# Patient Record
Sex: FEMALE | Race: WHITE | NOT HISPANIC OR LATINO | Employment: FULL TIME | URBAN - METROPOLITAN AREA
[De-identification: names, ages, dates, MRNs, and addresses within clinical notes are randomized per-mention and may not be internally consistent; named-entity substitution may affect disease eponyms.]

---

## 2023-05-01 ENCOUNTER — OCCUPATIONAL MEDICINE (OUTPATIENT)
Dept: URGENT CARE | Facility: PHYSICIAN GROUP | Age: 28
End: 2023-05-01
Payer: COMMERCIAL

## 2023-05-01 VITALS
RESPIRATION RATE: 18 BRPM | HEART RATE: 66 BPM | OXYGEN SATURATION: 97 % | DIASTOLIC BLOOD PRESSURE: 56 MMHG | WEIGHT: 186 LBS | HEIGHT: 72 IN | BODY MASS INDEX: 25.19 KG/M2 | SYSTOLIC BLOOD PRESSURE: 100 MMHG | TEMPERATURE: 98.3 F

## 2023-05-01 DIAGNOSIS — Z02.1 PRE-EMPLOYMENT DRUG SCREENING: Primary | ICD-10-CM
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
## 2023-05-01 DIAGNOSIS — R07.81 RIB PAIN: ICD-10-CM

## 2023-05-01 LAB
AMP AMPHETAMINE: NORMAL
BAR BARBITURATES: NORMAL
BREATH ALCOHOL COMMENT: NORMAL
BZO BENZODIAZEPINES: NORMAL
COC COCAINE: NORMAL
INT CON NEG: NORMAL
INT CON POS: NORMAL
MDMA ECSTASY: NORMAL
MET METHAMPHETAMINES: NORMAL
MTD METHADONE: NORMAL
OPI OPIATES: NORMAL
OXY OXYCODONE: NORMAL
PCP PHENCYCLIDINE: NORMAL
POC BREATHALIZER: 0 PERCENT (ref 0–0.01)
POC URINE DRUG SCREEN OCDRS: NEGATIVE
THC: NORMAL

## 2023-05-01 PROCEDURE — 80305 DRUG TEST PRSMV DIR OPT OBS: CPT

## 2023-05-01 PROCEDURE — 99203 OFFICE O/P NEW LOW 30 MIN: CPT

## 2023-05-01 PROCEDURE — 82075 ASSAY OF BREATH ETHANOL: CPT

## 2023-05-01 RX ORDER — HYDROXYZINE HYDROCHLORIDE 10 MG/1
TABLET, FILM COATED ORAL
COMMUNITY
Start: 2023-03-19 | End: 2023-09-24

## 2023-05-01 RX ORDER — CYCLOBENZAPRINE HCL 10 MG
10 TABLET ORAL
Qty: 7 TABLET | Refills: 0 | Status: SHIPPED | OUTPATIENT
Start: 2023-05-01 | End: 2023-05-08

## 2023-05-01 ASSESSMENT — ENCOUNTER SYMPTOMS
SHORTNESS OF BREATH: 0
COUGH: 0
FALLS: 0

## 2023-05-01 NOTE — PROGRESS NOTES
Subjective:     Shruthi Alvarado is a 28 y.o. female who presents for Work-Related Injury (DOI 04/30/2023 rib pain,left side)      DOI: 4/30/23  JOSE: Patient was lifting a water heater at work. She was trying to place the water heater (weighing approximately 50lbs) into a box and she felt a slipping sensation in her L ribcage. She immediately set the water heater down. She reports that she has had her ribs become displaced before. She awoke this morning with increased pain. She has tried ice and Ibuprofen with minimal relief of pain.   Prior injury: Yes, in November of 2022, but the injury had fully resolved. Although not formally diagnosed. She has seen a chiropractor who told her that her rib was out of place.   2nd Job: None.       Review of Systems   Respiratory:  Negative for cough and shortness of breath.    Cardiovascular:  Negative for chest pain.   Musculoskeletal:  Negative for falls.     PMH:  has no past medical history on file.  MEDS:   Current Outpatient Medications:   •  hydrOXYzine HCl (ATARAX) 10 MG Tab, TAKE 1 TABLET BY MOUTH EVERY NIGHT AT BEDTIME FOR INSOMNIA, Disp: , Rfl:   •  cyclobenzaprine (FLEXERIL) 10 mg Tab, Take 1 Tablet by mouth 1 time a day as needed for Muscle Spasms or Moderate Pain (To be taken at night) for up to 7 days., Disp: 7 Tablet, Rfl: 0  ALLERGIES:   Allergies   Allergen Reactions   • Cefdinir    • Toradol      SURGHX: History reviewed. No pertinent surgical history.  SOCHX:    FH: Family history was reviewed, no pertinent findings to report     Objective:     /56 (BP Location: Right arm, Patient Position: Sitting, BP Cuff Size: Adult)   Pulse 66   Temp 36.8 °C (98.3 °F) (Temporal)   Resp 18   Ht 1.829 m (6')   Wt 84.4 kg (186 lb)   SpO2 97%   BMI 25.23 kg/m²     Constitutional: Patient is in no acute distress. Appears well-developed and well-nourished.   Cardiovascular: Normal rate.    Pulmonary/Chest: Effort normal. No respiratory distress.   Neurological:  Patient is alert and oriented to person, place, and time.   Skin: Skin is warm and dry.   Psychiatric: Normal mood and affect. Behavior is normal.     General: Patient appears well, alert and oriented.  MSK: Tenderness to L ribcage. No bruising or skin changes.   Resp: Lungs CTAB. No wheezes, rhonchi rales.  Cardio: RRR. No murmurs, rubs, gallops.       Assessment/Plan:       1. Rib pain  - cyclobenzaprine (FLEXERIL) 10 mg Tab; Take 1 Tablet by mouth 1 time a day as needed for Muscle Spasms or Moderate Pain (To be taken at night) for up to 7 days.  Dispense: 7 Tablet; Refill: 0    2. Work related injury    Other orders  - hydrOXYzine HCl (ATARAX) 10 MG Tab; TAKE 1 TABLET BY MOUTH EVERY NIGHT AT BEDTIME FOR INSOMNIA    Released to Restricted Duty FROM 5/1/2023 TO 5/5/2023  Patient is to perform primarily seated work with minimal twisting.   Flexeril to be taken at night. Do not take concurrently with Hydroxyzine.   Ibuprofen and Tylenol OTC for pain as needed.         Differential diagnosis, natural history, supportive care, and indications for immediate follow-up discussed.    Approximately 25 minutes was spent in preparing for visit, obtaining history, exam and evaluation, patient counseling/education and post visit documentation/orders.

## 2023-05-01 NOTE — LETTER
[facility]  [address] - [place] [address]  Phone:  [phone] - Fax:  [phone]   [facility] Progress Report and Disability Certification  Date of Service: 5/1/2023   No Show:  No  Date / Time of Next Visit: 5/5/2023@8:30AM   Claim Information   Patient Name: Shruthi Alvarado  Claim Number:     Employer:   [employer] Date of Injury: 4/30/2023     Insurer / TPA: [employer]  ID / SSN:     Occupation: Trainer  Diagnosis: Diagnoses of Rib pain and Work related injury were pertinent to this visit.    Medical Information   Related to Industrial Injury? Yes    Subjective Complaints:  DOI: 4/30/23  JOSE: Patient was lifting a water heater at work. She was trying to place the water heater (weighing approximately 50lbs) into a box and she felt a slipping sensation in her L ribcage. She immediately set the water heater down. She reports that she has had her ribs become displaced before. She awoke this morning with increased pain. She has tried ice and Ibuprofen with minimal relief of pain.   Prior injury: Yes, in November of 2022, but the injury had fully resolved. Although not formally diagnosed. She has seen a chiropractor who told her that her rib was out of place.   2nd Job: None.      Objective Findings: General: Patient appears well, alert and oriented.  MSK: Tenderness to L ribcage. No bruising or skin changes.   Resp: Lungs CTAB. No wheezes, rhonchi rales.  Cardio: RRR. No murmurs, rubs, gallops.      Pre-Existing Condition(s): She has injured this rib in the past although not formally diagnosed. She was told by a chiropractor in November of 2022 that her rib was out of place, but that injury had resolved.    Assessment:   Initial Visit    Status: Additional Care Required  Permanent Disability:No    Plan: Medication (NOT at Work)    Diagnostics:      Comments:       Disability Information   Status: Released to Restricted Duty     From:  2023  Through: 2023 Restrictions are: Temporary   Physical Restrictions   Sitting:    Standing:    Stoopin hrs/day Bendin hrs/day   Squattin hrs/day Walking:    Climbin hrs/day Pushin hrs/day   Pullin hrs/day Other:    Reaching Above Shoulder (L): 0 hrs/day Reaching Above Shoulder (R):       Reaching Below Shoulder (L):    Reaching Below Shoulder (R):      Not to exceed Weight Limits   Carrying(hrs): 1 Weight Limit(lb): < or = to 10 pounds Lifting(hrs): 1 Weight  Limit(lb): < or = to 10 pounds   Comments: Patient is to perform primarily seated work with minimal twisting.   Flexeril to be taken at night. Do not take concurrently with Hydroxyzine.   Ibuprofen and Tylenol OTC for pain as needed.    Repetitive Actions   Hands: i.e. Fine Manipulations from Grasping:     Feet: i.e. Operating Foot Controls:     Driving / Operate Machinery:     Health Care Provider’s Original or Electronic Signature  Loren Nunez P.A.-C. Health Care Provider’s Original or Electronic Signature    Nadeem Naqvi DO MPH     Clinic Name / Location: [address]  [place] [address] Clinic Phone Number: Dept: [phone]   Appointment Time: 1:50 Pm Visit Start Time: 2:40 PM   Check-In Time:  2:27 Pm Visit Discharge Time:  3:35PM   Original-Treating Physician or Chiropractor    Page 2-Insurer/TPA    Page 3-Employer    Page 4-Employee

## 2023-05-01 NOTE — LETTER
EMPLOYEE’S CLAIM FOR COMPENSATION/ REPORT OF INITIAL TREATMENT  FORM C-4  PLEASE TYPE OR PRINT    EMPLOYEE’S CLAIM - PROVIDE ALL INFORMATION REQUESTED   First Name  Shruthi Last Name  Christiano Birthdate                    1995                Sex  female Claim Number (Insurer’s Use Only)   Home Address  [address] Age  28 y.o. 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 (if applicable)  Lifting a water heater    How did this injury or occupational disease occur? (Be specific and answer in detail. Use additional sheet if necessary)  I was trying to lift a water heater to pack it into a box and felt a rib slip.   If you believe that you have an occupational disease, when did you first have knowledge of the disability and its relationship to your employment?  N/A Witnesses to the Accident (if applicable)  no one      Nature of Injury or Occupational Disease  Strain  Part(s) of Body Injured or Affected  Chest, N/A, N/A    I CERTIFY THAT THE ABOVE IS TRUE AND CORRECT TO T HE BEST OF MY KNOWLEDGE AND THAT I HAVE  PROVIDED THIS INFORMATION IN ORDER TO OBTAIN THE BENEFITS OF [place], OR [place]).  I HEREBY AUTHORIZE ANY PHYSICIAN, CHIROPRACTOR, SURGEON, PRACTITIONER OR ANY OTHER PERSON, ANY HOSPITAL, INCLUDING [facility] OR [facility], ANY  MEDICAL SERVICE ORGANIZATION, ANY INSURANCE COMPANY, OR OTHER INSTITUTION OR ORGANIZATION TO RELEASE TO EACH OTHER, ANY MEDICAL OR OTHER INFORMATION, INCLUDING BENEFITS PAID OR PAYABLE, PERTINENT TO THIS INJURY OR DISEASE, EXCEPT INFORMATION RELATIVE TO DIAGNOSIS, TREATMENT AND/OR COUNSELING FOR AIDS, PSYCHOLOGICAL CONDITIONS, ALCOHOL OR CONTROLLED SUBSTANCES, FOR WHICH I MUST GIVE SPECIFIC AUTHORIZATION.  A PHOTOSTAT OF THIS AUTHORIZATION SHALL BE VALID AS THE ORIGINAL.       Date 5/1/2023   [facility] Employee’s Original or  *Electronic Signature   THIS REPORT MUST BE COMPLETED AND MAILED WITHIN 3 WORKING DAYS OF TREATMENT   [facility]  Name of Facility  [facility]   Date  5/1/2023 Diagnosis and Description of Injury or Occupational Disease  (R07.81) Rib pain  (Y99.0) Work related injury Is there evidence that the injured employee was under the influence of alcohol and/or another controlled substance at the time of accident?  ? No ? Yes (if yes, please explain)   Hour  2:40 PM   Diagnoses of Rib pain and Work related injury were pertinent to this visit. No   Treatment  -Rest, ice  -Tylenol, Ibuprofen OTC as needed for pain  -Flexeril to be taken at night. Not to be taken concurrently with Hydroxyzine.     Have you advised the patient to remain off work five days or     more?    X-Ray Findings      ? Yes Indicate dates:   From 5/1/2023 To  5/5/2023   From information given by the employee, together with medical evidence, can        you directly connect this injury or occupational disease as job incurred?  Yes ? No If no, is the  "injured employee capable of:  ? full duty  No ? modified duty  Yes   Is additional medical care by a physician indicated?  Yes If modified duty, specify any limitations / restrictions  See D-39   Do you know of any previous injury or disease contributing to this condition or occupational disease?  ? Yes ? No (Explain if yes)                          No   Date  5/1/2023 Print Health Care Provider's  Name  Loren Paredes P.A.-C. I certify that the employer’s copy of  this form was delivered to the employer on:   Address  [address] [place] [address]. [address] Insurer’s Use Only     [facility] Zip  [address]    Provider’s Tax ID Number  [id number] Telephone  Dept: [phone]             Health Care Provider’s Original or Electronic Signature  e-SignLOREN PAREDES P.A.-C. Degree (MD,DO, DC,PAAudreyC,APRN)  PAAudreyC      * Complete and attach Release of Information (Form C-4A) when injured employee signs C-4 Form electronically  ORIGINAL - TREATING HEALTHCARE PROVIDER PAGE 2 - INSURER/TPA PAGE 3 - EMPLOYER PAGE 4 - EMPLOYEE             Form C-4 (rev.08/21)           BRIEF DESCRIPTION OF RIGHTS AND BENEFITS  (Pursuant to [place] 616C.050)    Notice of Injury or Occupational Disease (Incident Report Form C-1): If an injury or occupational disease (OD) arises out of and in the course of employment, you must provide written notice to your employer as soon as practicable, but no later than 7 days after the accident or OD. Your employer shall maintain a sufficient supply of the required forms.    Claim for Compensation (Form C-4): If medical treatment is sought, the form C-4 is available at the place of initial treatment. A completed \"Claim for Compensation\" (Form C-4) must be filed within 90 days after an accident or OD. The treating physician or chiropractor must, within 3 working days after treatment, complete and mail to the employer, the employer's insurer and third-party , the Claim for " Compensation.    Medical Treatment: If you require medical treatment for your on-the-job injury or OD, you may be required to select a physician or chiropractor from a list provided by your workers’ compensation insurer, if it has contracted with an Organization for Managed Care (MCO) or Preferred Provider Organization (PPO) or providers of health care. If your employer has not entered into a contract with an MCO or PPO, you may select a physician or chiropractor from the Panel of Physicians and Chiropractors. Any medical costs related to your industrial injury or OD will be paid by your insurer.    Temporary Total Disability (TTD): If your doctor has certified that you are unable to work for a period of at least 5 consecutive days, or 5 cumulative days in a 20-day period, or places restrictions on you that your employer does not accommodate, you may be entitled to TTD compensation.    Temporary Partial Disability (TPD): If the wage you receive upon reemployment is less than the compensation for TTD to which you are entitled, the insurer may be required to pay you TPD compensation to make up the difference. TPD can only be paid for a maximum of 24 months.    Permanent Partial Disability (PPD): When your medical condition is stable and there is an indication of a PPD as a result of your injury or OD, within 30 days, your insurer must arrange for an evaluation by a rating physician or chiropractor to determine the degree of your PPD. The amount of your PPD award depends on the date of injury, the results of the PPD evaluation, your age and wage.    Permanent Total Disability (PTD): If you are medically certified by a treating physician or chiropractor as permanently and totally disabled and have been granted a PTD status by your insurer, you are entitled to receive monthly benefits not to exceed 66 2/3% of your average monthly wage. The amount of your PTD payments is subject to reduction if you previously received a  [facility]-sum PPD award.    Vocational Rehabilitation Services: You may be eligible for vocational rehabilitation services if you are unable to return to the job due to a permanent physical impairment or permanent restrictions as a result of your injury or occupational disease.    Transportation and Per Diane Reimbursement: You may be eligible for travel expenses and per diane associated with medical treatment.    Reopening: You may be able to reopen your claim if your condition worsens after claim closure.     Appeal Process: If you disagree with a written determination issued by the insurer or the insurer does not respond to your request, you may appeal to the Department of Administration, , by following the instructions contained in your determination letter. You must appeal the determination within 70 days from the date of the determination letter at [address], [address], [place], [place] [address], or [address], [address], [place], [place] [address]. If you disagree with the  decision, you may appeal to the Department of Administration, . You must file your appeal within 30 days from the date of the  decision letter at [address], [address], [place], [place] [address], or [address], [address], [place], [place] [address]. If you disagree with a decision of an , you may file a petition for judicial review with the District Court. You must do so within 30 days of the Appeal Officer’s decision. You may be represented by an  at your own expense or you may contact the [facility] for possible representation.    [place]  for Injured Workers (NAIW): If you disagree with a  decision, you may request that NAIW represent you without charge at an  Hearing. For information regarding denial of benefits, you may contact the [facility] at: [address], [address], [place]  [address], [phone], or [address], [address], [place] [address], [phone]    To File a Complaint with the Division: If you wish to file a complaint with the  of the Division of Industrial Relations (DIR),  please contact the Workers’ Compensation Section, [address], [address], [place], [place] [address], telephone [phone], or [address], [address], [place], [place] [address], telephone [phone].    For assistance with Workers’ Compensation Issues: You may contact the [facility] Office for Consumer Health Assistance, [address], [address], [place], [place] [address], Toll Free 1-727.426.7703, Web site: [email or web address] E-mail: [email or web address]              __________________________________________________________________                                    _________________            Employee Name / Signature                                                                                                                            Date                                                                                                                                                                                                                              D-2 (rev. 10/20)

## 2023-05-05 ENCOUNTER — OCCUPATIONAL MEDICINE (OUTPATIENT)
Dept: URGENT CARE | Facility: PHYSICIAN GROUP | Age: 28
End: 2023-05-05
Payer: COMMERCIAL

## 2023-05-05 VITALS
HEART RATE: 92 BPM | TEMPERATURE: 98.6 F | SYSTOLIC BLOOD PRESSURE: 108 MMHG | DIASTOLIC BLOOD PRESSURE: 58 MMHG | OXYGEN SATURATION: 99 % | WEIGHT: 185 LBS | BODY MASS INDEX: 25.06 KG/M2 | RESPIRATION RATE: 16 BRPM | HEIGHT: 72 IN
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
## 2023-05-05 DIAGNOSIS — R07.81 RIB PAIN: ICD-10-CM

## 2023-05-05 PROCEDURE — 99213 OFFICE O/P EST LOW 20 MIN: CPT | Performed by: NURSE PRACTITIONER

## 2023-05-05 RX ORDER — NAPROXEN 500 MG/1
TABLET ORAL
COMMUNITY
Start: 2023-02-17 | End: 2023-09-24

## 2023-05-05 ASSESSMENT — ENCOUNTER SYMPTOMS
TINGLING: 0
RESPIRATORY NEGATIVE: 1
FALLS: 0
FEVER: 0
CARDIOVASCULAR NEGATIVE: 1
MYALGIAS: 1
BRUISES/BLEEDS EASILY: 0
SENSORY CHANGE: 0
CHILLS: 0
GASTROINTESTINAL NEGATIVE: 1
WEAKNESS: 0

## 2023-05-05 NOTE — LETTER
[facility]  [address] [place] [address]. [address] - [place] [address]  Phone:  [phone] - Fax:  [phone]   [facility] Progress Report and Disability Certification  Date of Service: 5/5/2023   No Show:  No  Date / Time of Next Visit: 5/12/2023   Claim Information   Patient Name: Shruthi Alvarado  Claim Number:     Employer:    Date of Injury: 4/30/2023     Insurer / TPA: [employer]  ID / SSN:     Occupation: Trainer  Diagnosis: Diagnoses of Rib pain and Work related injury were pertinent to this visit.    Medical Information   Related to Industrial Injury? Yes    Subjective Complaints:  DOI: 4/30/23, Initial visit note:  JOSE: Patient was lifting a water heater at work. She was trying to place the water heater (weighing approximately 50lbs) into a box and she felt a slipping sensation in her L ribcage. She immediately set the water heater down. She reports that she has had her ribs become displaced before. She awoke this morning with increased pain. She has tried ice and Ibuprofen with minimal relief of pain.   Prior injury: Yes, in November of 2022, but the injury had fully resolved. Although not formally diagnosed. She has seen a chiropractor who told her that her rib was out of place.   2nd Job: None.     Today, 5/5/2023. 2nd encounter.  States still experiencing posterior left lower rib pain in her thoracic region.  Pain increases with movement especially with bending over.  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  Denies issues with breathing.  Heat application only during showers.  Denies bruising but did have mild swelling initially after injury.  States site is tender to touch.  Using no over-the-counter topical pain reliever.  Taking ibuprofen 800 twice daily but states not helping.  Flexeril taken only at nighttime due to drowsy effect.   Objective Findings: A/O x 3. Skin p/w/d, vitals WNL.  Decreased range of motion with flexion and twisting  motions due to discomfort.  Able to raise arms above head with minimal discomfort.  Tenderness on palpation of posterior left-sided thoracic region.  No swelling, bruising, erythema or crepitus on palpation.  No difficulty breathing while talking or at rest.   Pre-Existing Condition(s):     Assessment:   Condition Same    Status: Additional Care Required  Permanent Disability:No    Plan:      Diagnostics:      Comments:       Disability Information   Status: Released to Restricted Duty    From:  2023  Through: 2023 Restrictions are:     Physical Restrictions   Sitting:    Standing:    Stoopin hrs/day Bendin hrs/day   Squattin hrs/day Walking:    Climbin hrs/day Pushing:      Pulling:    Other:    Reaching Above Shoulder (L): 0 hrs/day Reaching Above Shoulder (R): 0 hrs/day     Reaching Below Shoulder (L):  0 hrs/day Reaching Below Shoulder (R):  0 hrs/day   Not to exceed Weight Limits   Carrying(hrs): 1 Weight Limit(lb): < or = to 10 pounds Lifting(hrs): 1 Weight  Limit(lb): < or = to 10 pounds   Comments: -Patient to continue to do primarily seated work with minimal twisting  -Continue to take Flexeril at night only due to drowsy effect as needed for muscle spasms  -Recommend to increase ibuprofen use to 600 mg every 6 hours as needed for pain, may alternate with Tylenol as needed  -Recommend heat complication with heat pad periodically throughout the workday when seated, 2-3x/day  -May use topical pain reliever that is lidocaine-based to help with localized pain relief-do not use under heat pad  -Recheck in 1 week    Repetitive Actions   Hands: i.e. Fine Manipulations from Grasping:     Feet: i.e. Operating Foot Controls:     Driving / Operate Machinery:     Health Care Provider’s Original or Electronic Signature  RAMIREZ Mooney. Health Care Provider’s Original or Electronic Signature    Nadeem Naqvi DO MPH     Clinic Name / Location: [place] [address]  [address]  [place] [address] Clinic Phone Number: Dept: [phone]   Appointment Time: 8:30 Am Visit Start Time: 8:27 AM   Check-In Time:  8:21 Am Visit Discharge Time:     Original-Treating Physician or Chiropractor    Page 2-Insurer/TPA    Page 3-Employer    Page 4-Employee

## 2023-05-05 NOTE — PROGRESS NOTES
Subjective     Shruthi Alvarado is a 28 y.o. female who presents with Rib Injury (Popped out of place (x6 days) follow up, not feeling better (Pain level 8), hurts to breath still muscle spasms are increasing, seen here at [facility] for first appointment )      DOI: 4/30/23, Initial visit note:  JOSE: Patient was lifting a water heater at work. She was trying to place the water heater (weighing approximately 50lbs) into a box and she felt a slipping sensation in her L ribcage. She immediately set the water heater down. She reports that she has had her ribs become displaced before. She awoke this morning with increased pain. She has tried ice and Ibuprofen with minimal relief of pain.   Prior injury: Yes, in November of 2022, but the injury had fully resolved. Although not formally diagnosed. She has seen a chiropractor who told her that her rib was out of place.   2nd Job: None.     Today, 5/5/2023. 2nd encounter.  States still experiencing posterior left lower rib pain in her thoracic region.  Pain increases with movement especially with bending over.  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  Denies issues with breathing.  Heat application only during showers.  Denies bruising but did have mild swelling initially after injury.  States site is tender to touch.  Using no over-the-counter topical pain reliever.  Taking ibuprofen 800 twice daily but states not helping.  Flexeril taken only at nighttime due to drowsy effect.     Rib Injury  Associated symptoms include myalgias. Pertinent negatives include no chills, fever, rash or weakness.   PMH: No pertinent past medical history to this problem  MEDS: Medications were reviewed in Epic  ALLERGIES: Allergies were reviewed in Epic  FH: No pertinent family history to this problem       Review of Systems   Constitutional:  Negative for chills, fever and malaise/fatigue.   Respiratory: Negative.     Cardiovascular: Negative.    Gastrointestinal: Negative.     Musculoskeletal:  Positive for myalgias. Negative for falls and joint pain.   Skin:  Negative for itching and rash.   Neurological:  Negative for tingling, sensory change and weakness.   Endo/Heme/Allergies:  Does not bruise/bleed easily.   All other systems reviewed and are negative.           Objective     /58 (BP Location: Left arm, Patient Position: Sitting, BP Cuff Size: Adult)   Pulse 92   Temp 37 °C (98.6 °F) (Temporal)   Resp 16   Ht 1.829 m (6')   Wt 83.9 kg (185 lb)   SpO2 99%   BMI 25.09 kg/m²      Physical Exam  Vitals reviewed.   Constitutional:       General: She is awake. She is not in acute distress.     Appearance: Normal appearance. She is well-developed. She is not ill-appearing, toxic-appearing or diaphoretic.   Cardiovascular:      Rate and Rhythm: Normal rate.   Pulmonary:      Effort: Pulmonary effort is normal.   Musculoskeletal:      Thoracic back: Spasms and tenderness present. No swelling, edema, deformity, signs of trauma, lacerations or bony tenderness. Decreased range of motion. No scoliosis.        Back:    Skin:     General: Skin is warm and dry.      Findings: No abrasion, ecchymosis, erythema, signs of injury, laceration, rash or wound.   Neurological:      Mental Status: She is alert and oriented to person, place, and time.   Psychiatric:         Attention and Perception: Attention normal.         Mood and Affect: Mood normal.         Speech: Speech normal.         Behavior: Behavior normal. Behavior is cooperative.       A/O x 3. Skin p/w/d, vitals WNL.  Decreased range of motion with flexion and twisting motions due to discomfort.  Able to raise arms above head with minimal discomfort.  Tenderness on palpation of posterior left-sided thoracic region.  No swelling, bruising, erythema or crepitus on palpation.  No difficulty breathing while talking or at rest.                   Assessment & Plan        1. Rib pain      2. Work related injury       -Patient to  continue to do primarily seated work with minimal twisting   -Continue to take Flexeril at night only due to drowsy effect as needed for muscle spasms   -Recommend to increase ibuprofen use to 600 mg every 6 hours as needed for pain, may alternate with Tylenol as needed   -Recommend heat complication with heat pad periodically throughout the workday when seated, 2-3x/day   -May use topical pain reliever that is lidocaine-based to help with localized pain relief-do not use under heat pad   -Recheck in 1 week

## 2023-05-12 ENCOUNTER — OCCUPATIONAL MEDICINE (OUTPATIENT)
Dept: URGENT CARE | Facility: PHYSICIAN GROUP | Age: 28
End: 2023-05-12
Payer: COMMERCIAL

## 2023-05-12 VITALS
HEART RATE: 89 BPM | HEIGHT: 72 IN | BODY MASS INDEX: 25.06 KG/M2 | SYSTOLIC BLOOD PRESSURE: 104 MMHG | TEMPERATURE: 98.9 F | DIASTOLIC BLOOD PRESSURE: 60 MMHG | OXYGEN SATURATION: 94 % | RESPIRATION RATE: 12 BRPM | WEIGHT: 185 LBS

## 2023-05-12 DIAGNOSIS — S29.011D INTERCOSTAL MUSCLE STRAIN, SUBSEQUENT ENCOUNTER: ICD-10-CM

## 2023-05-12 PROCEDURE — 99213 OFFICE O/P EST LOW 20 MIN: CPT | Performed by: FAMILY MEDICINE

## 2023-05-12 NOTE — PROGRESS NOTES
Subjective:         Chief Complaint   Patient presents with    Follow-Up     WC f/u pinched muscle pain still          DOI: 4/30      Here for f/u on left intercostal strain      States pain improved, but still present.       No longer needing to use flexeril - only taking motrin, as needed.                denies dyspnea.       Pertinent negatives include no claudication, cough, exertional chest pressure, fever, nausea, near-syncope, numbness, syncope or vomiting.         No past medical history on file.             Family history was reviewed and not pertinent       Review of Systems:  Review of Systems   Constitutional: Negative for fever.   HENT: no neck pain, headache or dizziness  Eyes: denies vision changes  Respiratory: no cough, congestion, SOB  Cardiovascular: denies palpations     Gastrointestinal: denies diarrhea, abdominal pain or constipation.  No blood in stool.  Musculoskeletal: denies back pain or joint pain    Skin: no itching or rash  Neurological: No numbness or tingling.   10 point ROS otherwise negative, except per HPI         Objective:     /60 (BP Location: Right arm, Patient Position: Sitting, BP Cuff Size: Adult)   Pulse 89   Temp 37.2 °C (98.9 °F) (Temporal)   Resp 12   Ht 1.829 m (6')   Wt 83.9 kg (185 lb)   SpO2 94%       Physical Exam   Constitutional: pt is oriented to person, place, and time. Pt appears well-developed and well-nourished.   HENT:   Head: Normocephalic and atraumatic.   Mouth/Throat: Oropharynx is clear and moist.   Eyes: Conjunctivae and EOM are normal. Pupils are equal, round, and reactive to light. No scleral icterus.   Neck: Normal range of motion. Neck supple. No JVD present. No thyromegaly present.   Cardiovascular: Normal rate, regular rhythm, normal heart sounds and intact distal pulses.  Exam reveals no gallop and no friction rub.    No murmur heard.  Pulmonary/Chest: Effort normal and breath sounds normal. No respiratory distress. Pt has no wheezes.  Pt has no rales. Pt exhibits point tenderness over several lower ribs on left side   Abdominal: Soft.   Musculoskeletal: Normal range of motion. Pt exhibits no edema.   Lymphadenopathy:     Pt has no cervical adenopathy.   Neurological: pt is alert and oriented to person, place, and time. No cranial nerve deficit.   Skin: Skin is warm and dry. No erythema.   Psychiatric: pt has a normal mood and affect. patient's behavior is normal.          Assessment/Plan:        1. Intercostal muscle strain, subsequent encounter  Improving  Restrictions per D39    F/u one wk

## 2023-05-12 NOTE — LETTER
[facility]  [address] [place] [address]. [address] - [place] [address]  Phone:  [phone] - Fax:  [phone]   [facility] Progress Report and Disability Certification  Date of Service: 5/12/2023   No Show:  No  Date / Time of Next Visit: 5/19/2023   Claim Information   Patient Name: Shruthi Alvarado  Claim Number:     Employer:   [employer] Date of Injury: 4/30/2023     Insurer / TPA: [employer]  ID / SSN:     Occupation: Trainer  Diagnosis: The encounter diagnosis was Intercostal muscle strain, subsequent encounter.    Medical Information   Related to Industrial Injury? Yes    Subjective Complaints:        DOI: 4/30      Here for f/u on left intercostal strain      States pain improved, but still present.       No longer needing to use flexeril - only taking motrin, as needed.                denies dyspnea.       Pertinent negatives include no claudication, cough, exertional chest pressure, fever, nausea, near-syncope, numbness, syncope or vomiting.        Objective Findings: Pulmonary/Chest: Effort normal and breath sounds normal. No respiratory distress. Pt has no wheezes. Pt has no rales. Pt exhibits point tenderness over several lower ribs on left side    Pre-Existing Condition(s):     Assessment:   Condition Improved    Status: Additional Care Required  Permanent Disability:No    Plan:      Diagnostics:      Comments:       Disability Information   Status: Released to Restricted Duty    From:  5/12/2023  Through: 5/19/2023 Restrictions are: Temporary   Physical Restrictions   Sitting:    Standing:    Stooping:    Bending:      Squatting:    Walking:    Climbing:    Pushing:      Pulling:    Other:    Reaching Above Shoulder (L):   Reaching Above Shoulder (R):       Reaching Below Shoulder (L):    Reaching Below Shoulder (R):      Not to exceed Weight Limits   Carrying(hrs):   Weight Limit(lb): < or = to 10 pounds Lifting(hrs):   Weight  Limit(lb):  < or = to 10 pounds   Comments: Intercostal muscle strain, subsequent encounter  Improving  Restrictions per D39    F/u one wk    Repetitive Actions   Hands: i.e. Fine Manipulations from Grasping:     Feet: i.e. Operating Foot Controls:     Driving / Operate Machinery:     Health Care Provider’s Original or Electronic Signature  Santos Olmstead M.D. Health Care Provider’s Original or Electronic Signature    Nadeem Naqvi DO MPH     Clinic Name / Location: [address]  [place] [address] Clinic Phone Number: Dept: [phone]   Appointment Time: 8:30 Am Visit Start Time: 8:33 AM   Check-In Time:  8:27 Am Visit Discharge Time: 9:10 AM   Original-Treating Physician or Chiropractor    Page 2-Insurer/TPA    Page 3-Employer    Page 4-Employee

## 2023-05-19 ENCOUNTER — OCCUPATIONAL MEDICINE (OUTPATIENT)
Dept: URGENT CARE | Facility: PHYSICIAN GROUP | Age: 28
End: 2023-05-19
Payer: COMMERCIAL

## 2023-05-19 VITALS
HEIGHT: 72 IN | WEIGHT: 185.6 LBS | SYSTOLIC BLOOD PRESSURE: 122 MMHG | HEART RATE: 88 BPM | TEMPERATURE: 98.5 F | RESPIRATION RATE: 16 BRPM | DIASTOLIC BLOOD PRESSURE: 80 MMHG | OXYGEN SATURATION: 97 % | BODY MASS INDEX: 25.14 KG/M2

## 2023-05-19 DIAGNOSIS — R07.81 RIB PAIN: ICD-10-CM

## 2023-05-19 DIAGNOSIS — S29.011D INTERCOSTAL MUSCLE STRAIN, SUBSEQUENT ENCOUNTER: ICD-10-CM

## 2023-05-19 PROCEDURE — 3079F DIAST BP 80-89 MM HG: CPT | Performed by: PHYSICIAN ASSISTANT

## 2023-05-19 PROCEDURE — 99213 OFFICE O/P EST LOW 20 MIN: CPT | Performed by: PHYSICIAN ASSISTANT

## 2023-05-19 PROCEDURE — 3074F SYST BP LT 130 MM HG: CPT | Performed by: PHYSICIAN ASSISTANT

## 2023-05-19 ASSESSMENT — ENCOUNTER SYMPTOMS
HEMOPTYSIS: 0
BRUISES/BLEEDS EASILY: 0
VOMITING: 0
BACK PAIN: 1
COUGH: 0
CHILLS: 0
SHORTNESS OF BREATH: 0
NAUSEA: 0
FEVER: 0

## 2023-05-19 NOTE — PROGRESS NOTES
"Subjective     Shruthi Alvarado is a 28 y.o. female who presents with Work-Related Injury (Follow Up. DOI 4/30/2023 Was lifting a water heater injuring Rib, Feels A lot Better, Soreness )      DOI: 4/30/2023      ORIGINAL HPI COPIED: \"Patient was lifting a water heater at work. She was trying to place the water heater (weighing approximately 50lbs) into a box and she felt a slipping sensation in her L ribcage. She immediately set the water heater down. She reports that she has had her ribs become displaced before. She awoke this morning with increased pain. She has tried ice and Ibuprofen with minimal relief of pain.   Prior injury: Yes, in November of 2022, but the injury had fully resolved. Although not formally diagnosed. She has seen a chiropractor who told her that her rib was out of place.   2nd Job: None.\"      05/19/2023, Visit # 4: At initial visit on 5/1/2023 patient was diagnosed with rib pain and was prescribed Flexeril.  It was also recommended that she use OTC ibuprofen or acetaminophen as needed.  She was also placed on muscle relaxers.  At return visit on 5/5 patient was reporting continued pain.  It was recommended that she use ibuprofen 600 mg every 6 hours as needed and alternate with Tylenol as well.  It was also recommended that she apply heating pads 2-3 times per day and continue to use the muscle relaxer at nighttime.  Also discussed option of trying topical lidocaine patches to the area.  She was continued on work restrictions.  At last follow-up on 5/12 patient was reporting significant improvement in her pain but still had some pain.  She was no longer needing to use the Flexeril, however.  She was placed on work restrictions of no lifting or carrying heavier than 10 pounds.  She now returns today for her fourth visit stating that she feels significant improvement in her symptoms.  Still does some mild soreness but no spasms and it does not really worsen with any type of movement.  She " would like to try full duty.      Review of Systems   Constitutional:  Negative for chills and fever.   Respiratory:  Negative for cough, hemoptysis and shortness of breath.    Cardiovascular:  Negative for chest pain.   Gastrointestinal:  Negative for nausea and vomiting.   Musculoskeletal:  Positive for back pain.        Rib pain   Endo/Heme/Allergies:  Does not bruise/bleed easily.         PMH: No pertinent past medical history to this problem  MEDS: Medications were reviewed in Epic  ALLERGIES: Allergies were reviewed in Epic  SOCHX: Reviewed  FH: No pertinent family history to this problem    Objective     /80 (BP Location: Right arm, Patient Position: Sitting, BP Cuff Size: Adult)   Pulse 88   Temp 36.9 °C (98.5 °F) (Temporal)   Resp 16   Ht 1.829 m (6')   Wt 84.2 kg (185 lb 9.6 oz)   SpO2 97%   BMI 25.17 kg/m²      Physical Exam  Constitutional:       General: She is not in acute distress.     Appearance: She is not diaphoretic.   HENT:      Head: Normocephalic and atraumatic.      Right Ear: External ear normal.      Left Ear: External ear normal.   Eyes:      Conjunctiva/sclera: Conjunctivae normal.      Pupils: Pupils are equal, round, and reactive to light.   Pulmonary:      Effort: Pulmonary effort is normal. No respiratory distress.      Breath sounds: No decreased breath sounds, wheezing, rhonchi or rales.   Chest:      Comments: Left-sided lateral and posterior rib cage/thoracic back region exhibits some very mild tenderness to palpation.  No spasm noted.  No overlying erythema or ecchymosis.  Patient has full range of motion.  Musculoskeletal:      Cervical back: Normal range of motion.   Skin:     Findings: No rash.   Neurological:      Mental Status: She is alert and oriented to person, place, and time.   Psychiatric:         Mood and Affect: Mood and affect normal.         Cognition and Memory: Memory normal.         Judgment: Judgment normal.           Assessment & Plan       1.  Intercostal muscle strain, subsequent encounter    2. Rib pain    Patient with significant improvement in her symptoms.  We will trial full duty for this week.  Return next Friday for reevaluation.  Anticipate discharge/MMI at that time.          Differential Diagnosis, natural history, and supportive care discussed. Return to the Urgent Care or follow up with your PCP if symptoms fail to resolve, or for any new or worsening symptoms. Emergency room precautions discussed. Patient and/or family appears understanding of information.

## 2023-05-19 NOTE — LETTER
"   [facility]  [address] [place] [address]. [address] - [place] [address]  Phone:  [phone] - Fax:  [phone]   [facility] Progress Report and Disability Certification  Date of Service: 5/19/2023   No Show:  No  Date / Time of Next Visit: 5/26/2023  8:30am    Claim Information   Patient Name: Shruthi Alvarado  Claim Number:     Employer:   [employer] Date of Injury: 4/30/2023     Insurer / TPA: [employer]  ID / SSN:     Occupation: Trainer  Diagnosis: Diagnoses of Intercostal muscle strain, subsequent encounter and Rib pain were pertinent to this visit.    Medical Information   Related to Industrial Injury? Yes    Subjective Complaints:  DOI: 4/30/2023      ORIGINAL HPI COPIED: \"Patient was lifting a water heater at work. She was trying to place the water heater (weighing approximately 50lbs) into a box and she felt a slipping sensation in her L ribcage. She immediately set the water heater down. She reports that she has had her ribs become displaced before. She awoke this morning with increased pain. She has tried ice and Ibuprofen with minimal relief of pain.   Prior injury: Yes, in November of 2022, but the injury had fully resolved. Although not formally diagnosed. She has seen a chiropractor who told her that her rib was out of place.   2nd Job: None.\"      05/19/2023, Visit # 4: At initial visit on 5/1/2023 patient was diagnosed with rib pain and was prescribed Flexeril.  It was also recommended that she use OTC ibuprofen or acetaminophen as needed.  She was also placed on muscle relaxers.  At return visit on 5/5 patient was reporting continued pain.  It was recommended that she use ibuprofen 600 mg every 6 hours as needed and alternate with Tylenol as well.  It was also recommended that she apply heating pads 2-3 times per day and continue to use the muscle relaxer at nighttime.  Also discussed option of trying topical lidocaine patches to the " area.  She was continued on work restrictions.  At last follow-up on 5/12 patient was reporting significant improvement in her pain but still had some pain.  She was no longer needing to use the Flexeril, however.  She was placed on work restrictions of no lifting or carrying heavier than 10 pounds.  She now returns today for her fourth visit stating that she feels significant improvement in her symptoms.  Still does some mild soreness but no spasms and it does not really worsen with any type of movement.  She would like to try full duty.     Objective Findings: Pulmonary:      Effort: Pulmonary effort is normal. No respiratory distress.      Breath sounds: No decreased breath sounds, wheezing, rhonchi or rales.   Chest:      Comments: Left-sided lateral and posterior rib cage/thoracic back region exhibits some very mild tenderness to palpation.  No spasm noted.  No overlying erythema or ecchymosis.  Patient has full range of motion.  Musculoskeletal:      Cervical back: Normal range of motion.      Pre-Existing Condition(s):     Assessment:   Condition Improved    Status: Additional Care Required  Permanent Disability:No    Plan:      Diagnostics:      Comments:       Disability Information   Status: Released to Full Duty    From:  5/19/2023  Through: 5/26/2023 Restrictions are: Temporary   Physical Restrictions   Sitting:    Standing:    Stooping:    Bending:      Squatting:    Walking:    Climbing:    Pushing:      Pulling:    Other:    Reaching Above Shoulder (L):   Reaching Above Shoulder (R):       Reaching Below Shoulder (L):    Reaching Below Shoulder (R):      Not to exceed Weight Limits   Carrying(hrs):   Weight Limit(lb):   Lifting(hrs):   Weight  Limit(lb):     Comments:      Repetitive Actions   Hands: i.e. Fine Manipulations from Grasping:     Feet: i.e. Operating Foot Controls:     Driving / Operate Machinery:     Health Care Provider’s Original or Electronic Signature  Pretty Zurita P.A.-C.  Health Care Provider’s Original or Electronic Signature    Nadeem Naqvi DO MPH     Clinic Name / Location: [address]  [place] [address] Clinic Phone Number: Dept: [phone]   Appointment Time: 9:05 Am Visit Start Time: 9:40 AM   Check-In Time:  8:56 Am Visit Discharge Time:  10:07AM    Original-Treating Physician or Chiropractor    Page 2-Insurer/TPA    Page 3-Employer    Page 4-Employee

## 2023-09-16 ENCOUNTER — OFFICE VISIT (OUTPATIENT)
Dept: URGENT CARE | Facility: PHYSICIAN GROUP | Age: 28
End: 2023-09-16
Payer: COMMERCIAL

## 2023-09-16 VITALS
RESPIRATION RATE: 18 BRPM | OXYGEN SATURATION: 97 % | DIASTOLIC BLOOD PRESSURE: 76 MMHG | WEIGHT: 183 LBS | HEART RATE: 103 BPM | BODY MASS INDEX: 24.82 KG/M2 | TEMPERATURE: 98.8 F | SYSTOLIC BLOOD PRESSURE: 114 MMHG

## 2023-09-16 DIAGNOSIS — J02.0 STREP PHARYNGITIS: ICD-10-CM

## 2023-09-16 DIAGNOSIS — J02.9 SORE THROAT: ICD-10-CM

## 2023-09-16 LAB — S PYO DNA SPEC NAA+PROBE: DETECTED

## 2023-09-16 PROCEDURE — 3078F DIAST BP <80 MM HG: CPT | Performed by: PHYSICIAN ASSISTANT

## 2023-09-16 PROCEDURE — 3074F SYST BP LT 130 MM HG: CPT | Performed by: PHYSICIAN ASSISTANT

## 2023-09-16 PROCEDURE — 99213 OFFICE O/P EST LOW 20 MIN: CPT | Performed by: PHYSICIAN ASSISTANT

## 2023-09-16 PROCEDURE — 87651 STREP A DNA AMP PROBE: CPT | Performed by: PHYSICIAN ASSISTANT

## 2023-09-16 RX ORDER — AMOXICILLIN 500 MG/1
500 CAPSULE ORAL 2 TIMES DAILY
Qty: 20 CAPSULE | Refills: 0 | Status: SHIPPED | OUTPATIENT
Start: 2023-09-16 | End: 2023-09-26

## 2023-09-16 ASSESSMENT — ENCOUNTER SYMPTOMS
HEADACHES: 1
EYE DISCHARGE: 0
TROUBLE SWALLOWING: 0
SORE THROAT: 1
VOMITING: 0
SHORTNESS OF BREATH: 0
NAUSEA: 1
MYALGIAS: 1
DIARRHEA: 0
SWOLLEN GLANDS: 1
COUGH: 1
FEVER: 0
EYE REDNESS: 0

## 2023-09-16 NOTE — PROGRESS NOTES
Subjective     Shruthi Alvarado is a 28 y.o. female who presents with Pharyngitis (Swollen tonsils, white spots x 2 day)            Pharyngitis   This is a new problem. Episode onset: x 2 days ago. The problem has been unchanged. Neither side of throat is experiencing more pain than the other. There has been no fever. The pain is moderate. Associated symptoms include congestion, coughing (The patient reports an intermittent cough.), headaches, a plugged ear sensation and swollen glands. Pertinent negatives include no diarrhea, drooling, ear pain, shortness of breath, trouble swallowing or vomiting. She has had no exposure to strep. Treatments tried: OTC DayQuil and OTC NyQuil.     The patient reports no recent sick contacts.    PMH:  has no past medical history on file.  MEDS:   Current Outpatient Medications:     naproxen (NAPROSYN) 500 MG Tab, TAKE 1 TABLET BY MOUTH TWICE DAILY AS NEEDED FOR HEADACHE (Patient not taking: Reported on 5/19/2023), Disp: , Rfl:     hydrOXYzine HCl (ATARAX) 10 MG Tab, TAKE 1 TABLET BY MOUTH EVERY NIGHT AT BEDTIME FOR INSOMNIA (Patient not taking: Reported on 5/5/2023), Disp: , Rfl:   ALLERGIES:   Allergies   Allergen Reactions    Cefdinir     Toradol      SURGHX: No past surgical history on file.  SOCHX:  reports that she has never smoked. She has never used smokeless tobacco.  FH: Family history was reviewed, no pertinent findings to report    Review of Systems   Constitutional:  Negative for fever.   HENT:  Positive for congestion and sore throat. Negative for drooling, ear pain and trouble swallowing.    Eyes:  Negative for discharge and redness.   Respiratory:  Positive for cough (The patient reports an intermittent cough.). Negative for shortness of breath.    Gastrointestinal:  Positive for nausea. Negative for diarrhea and vomiting.   Musculoskeletal:  Positive for myalgias.   Neurological:  Positive for headaches.              Objective     /76 (BP Location: Right arm,  Patient Position: Sitting, BP Cuff Size: Adult)   Pulse (!) 103   Temp 37.1 °C (98.8 °F) (Temporal)   Resp 18   Wt 83 kg (183 lb)   SpO2 97%   BMI 24.82 kg/m²      Physical Exam  Constitutional:       General: She is not in acute distress.     Appearance: Normal appearance. She is not ill-appearing.   HENT:      Head: Normocephalic and atraumatic.      Right Ear: Tympanic membrane, ear canal and external ear normal.      Left Ear: Tympanic membrane, ear canal and external ear normal.      Nose: Nose normal.      Mouth/Throat:      Mouth: Mucous membranes are moist.      Pharynx: Oropharynx is clear. Uvula midline. Posterior oropharyngeal erythema present.      Tonsils: Tonsillar exudate present. 2+ on the right. 2+ on the left.   Eyes:      Extraocular Movements: Extraocular movements intact.      Conjunctiva/sclera: Conjunctivae normal.   Cardiovascular:      Rate and Rhythm: Normal rate and regular rhythm.      Heart sounds: Normal heart sounds.   Pulmonary:      Effort: Pulmonary effort is normal. No respiratory distress.      Breath sounds: Normal breath sounds. No wheezing.   Musculoskeletal:         General: Normal range of motion.      Cervical back: Normal range of motion and neck supple.   Skin:     General: Skin is warm and dry.   Neurological:      Mental Status: She is alert and oriented to person, place, and time.                  Progress:  Results for orders placed or performed in visit on 09/16/23   POCT Cepheid Group A Strep - PCR   Result Value Ref Range    POC Group A Strep, PCR Detected (A) Not Detected, Invalid                Assessment & Plan          1. Sore throat  - POCT Cepheid Group A Strep - PCR    2. Strep pharyngitis  - amoxicillin (AMOXIL) 500 MG Cap; Take 1 Capsule by mouth 2 times a day for 10 days.  Dispense: 20 Capsule; Refill: 0    Differential diagnoses, supportive care, and indications for immediate follow-up discussed with patient.   Instructed to return to clinic or  nearest emergency department for any change in condition, further concerns, or worsening of symptoms.  OTC Tylenol or Motrin for fever/discomfort.  OTC Supportive Care for Sore Throat - warm salt water gargles, sore throat lozenges, warm lemon water, and/or tea.  Drink plenty of fluids  Follow-up with PCP   Return to clinic or go to the ED if symptoms worsen or fail to improve, or if patient should develop worsening/increasing sore throat, difficulty swallowing, drooling, change in voice, swollen glands, shortness of breath, ear pain, cough, congestion, fever/chills, and/or any concerning symptoms.    Discussed plan with the patient, and she agrees to the above.     I personally reviewed prior external notes and test results pertinent to today's visit.  I have independently reviewed and interpreted all diagnostics ordered during this urgent care visit.     Please note that this dictation was created using voice recognition software. I have made every reasonable attempt to correct obvious errors, but I expect that there may be errors of grammar and possibly content that I did not discover before finalizing the note.     This note was electronically signed by Amie Sandra PA-C

## 2023-09-16 NOTE — LETTER
September 16, 2023         Patient: Shruthi Alvarado   YOB: 1995   Date of Visit: 9/16/2023           To Whom it May Concern:    Shruthi Alvarado was seen in my clinic on 9/16/2023. Please excuse her from work 9/17/2023. She may return to work on 9/18/2023.    If you have any questions or concerns, please don't hesitate to call.        Sincerely,           Amie Sandra P.A.-C.  Electronically Signed

## 2023-09-18 ENCOUNTER — TELEPHONE (OUTPATIENT)
Dept: URGENT CARE | Facility: PHYSICIAN GROUP | Age: 28
End: 2023-09-18

## 2023-09-18 NOTE — TELEPHONE ENCOUNTER
Patient called and said work will not accept her current work note and needs it to say she was contagious and excused 9/17 and able to return the following day 9/18, Thank you!

## 2023-09-24 ENCOUNTER — OFFICE VISIT (OUTPATIENT)
Dept: URGENT CARE | Facility: CLINIC | Age: 28
End: 2023-09-24
Payer: COMMERCIAL

## 2023-09-24 VITALS
WEIGHT: 187 LBS | TEMPERATURE: 97.3 F | SYSTOLIC BLOOD PRESSURE: 122 MMHG | BODY MASS INDEX: 25.33 KG/M2 | HEART RATE: 72 BPM | OXYGEN SATURATION: 96 % | RESPIRATION RATE: 14 BRPM | HEIGHT: 72 IN | DIASTOLIC BLOOD PRESSURE: 68 MMHG

## 2023-09-24 DIAGNOSIS — N30.01 ACUTE CYSTITIS WITH HEMATURIA: ICD-10-CM

## 2023-09-24 LAB
APPEARANCE UR: NORMAL
BILIRUB UR STRIP-MCNC: NORMAL MG/DL
COLOR UR AUTO: NORMAL
GLUCOSE UR STRIP.AUTO-MCNC: 100 MG/DL
KETONES UR STRIP.AUTO-MCNC: NORMAL MG/DL
LEUKOCYTE ESTERASE UR QL STRIP.AUTO: NORMAL
NITRITE UR QL STRIP.AUTO: POSITIVE
PH UR STRIP.AUTO: 5.5 [PH] (ref 5–8)
POCT INT CON NEG: NEGATIVE
POCT INT CON POS: POSITIVE
POCT URINE PREGNANCY TEST: NEGATIVE
PROT UR QL STRIP: 30 MG/DL
RBC UR QL AUTO: NORMAL
SP GR UR STRIP.AUTO: 1.03
UROBILINOGEN UR STRIP-MCNC: 1 MG/DL

## 2023-09-24 PROCEDURE — 81002 URINALYSIS NONAUTO W/O SCOPE: CPT | Performed by: PHYSICIAN ASSISTANT

## 2023-09-24 PROCEDURE — 99213 OFFICE O/P EST LOW 20 MIN: CPT | Performed by: PHYSICIAN ASSISTANT

## 2023-09-24 PROCEDURE — 3078F DIAST BP <80 MM HG: CPT | Performed by: PHYSICIAN ASSISTANT

## 2023-09-24 PROCEDURE — 81025 URINE PREGNANCY TEST: CPT | Performed by: PHYSICIAN ASSISTANT

## 2023-09-24 PROCEDURE — 3074F SYST BP LT 130 MM HG: CPT | Performed by: PHYSICIAN ASSISTANT

## 2023-09-24 RX ORDER — NITROFURANTOIN 25; 75 MG/1; MG/1
100 CAPSULE ORAL 2 TIMES DAILY
Qty: 10 CAPSULE | Refills: 0 | Status: SHIPPED | OUTPATIENT
Start: 2023-09-24 | End: 2023-09-24

## 2023-09-24 RX ORDER — NITROFURANTOIN 25; 75 MG/1; MG/1
100 CAPSULE ORAL 2 TIMES DAILY
Qty: 10 CAPSULE | Refills: 0 | Status: SHIPPED | OUTPATIENT
Start: 2023-09-24 | End: 2023-09-29

## 2023-09-24 ASSESSMENT — ENCOUNTER SYMPTOMS
NAUSEA: 0
CARDIOVASCULAR NEGATIVE: 1
VOMITING: 0
FLANK PAIN: 0
ABDOMINAL PAIN: 0
RESPIRATORY NEGATIVE: 1
DIARRHEA: 0
CONSTITUTIONAL NEGATIVE: 1

## 2023-09-25 NOTE — PROGRESS NOTES
Subjective:     Shruthi Alvarado  is a 28 y.o. female who presents for UTI (X4 days, dysuria, frequent urination)       She presents today with dysuria and urinary frequency has been ongoing the last 4 days.  Denies fevers, no flank pain.  Has had UTIs in the past, current symptoms do feel similar to those previously experienced.  No vaginal pain bleeding or discharge.       Review of Systems   Constitutional: Negative.    Respiratory: Negative.     Cardiovascular: Negative.    Gastrointestinal:  Negative for abdominal pain, diarrhea, nausea and vomiting.   Genitourinary:  Positive for dysuria, frequency and urgency. Negative for flank pain and hematuria.      Allergies   Allergen Reactions    Cefdinir     Toradol      History reviewed. No pertinent past medical history.     Objective:   /68 (BP Location: Left arm, Patient Position: Sitting, BP Cuff Size: Adult)   Pulse 72   Temp 36.3 °C (97.3 °F)   Resp 14   Ht 1.829 m (6')   Wt 84.8 kg (187 lb)   SpO2 96%   BMI 25.36 kg/m²   Physical Exam  Vitals and nursing note reviewed.   Constitutional:       General: She is not in acute distress.     Appearance: She is not ill-appearing or toxic-appearing.   HENT:      Head: Normocephalic.   Eyes:      General: No scleral icterus.     Conjunctiva/sclera: Conjunctivae normal.   Cardiovascular:      Rate and Rhythm: Normal rate and regular rhythm.   Pulmonary:      Effort: Pulmonary effort is normal. No respiratory distress.      Breath sounds: Normal breath sounds. No stridor.   Abdominal:      General: Abdomen is flat. Bowel sounds are normal. There is no distension.      Palpations: Abdomen is soft.      Tenderness: There is no abdominal tenderness. There is no right CVA tenderness, left CVA tenderness or guarding.   Musculoskeletal:      Cervical back: Neck supple.   Neurological:      Mental Status: She is alert and oriented to person, place, and time.   Psychiatric:         Mood and Affect: Mood normal.          Behavior: Behavior normal.         Thought Content: Thought content normal.         Judgment: Judgment normal.             Diagnostic testing:    POC urinalysis-positive glucose, trace ketones, positive protein, positive nitrites, all others within normal limits    Assessment/Plan:     Encounter Diagnoses   Name Primary?    Acute cystitis with hematuria           Plan for care for today's complaint includes starting patient on Macrobid for acute cystitis based on symptom presentation and urinalysis results.  No evidence of pyelonephritis on exam today.  Continue to monitor symptoms and return to urgent care or follow-up with primary care provider if symptoms remain ongoing.  Follow-up in the emergency department if symptoms become severe, ER precautions discussed in office today..  Prescription for Macrobid provided.    See AVS Instructions below for written guidance provided to patient on after-visit management and care in addition to our verbal discussion during the visit.    Please note that this dictation was created using voice recognition software. I have attempted to correct all errors, but there may be sound-alike, spelling, grammar and possibly content errors that I did not discover before finalizing the note.    Elgin Mccollum PA-C

## 2023-10-30 NOTE — ADDENDUM NOTE
[facility]  INPATIENT WOUND & OSTOMY CONSULTATION    Today's Date: 10/30/23    Patient Name: Desi Hatfield  MRN: [id number]  [facility]: [id number]  PCP: Provider, No Known  Referring Provider:   Consulting Provider (From admission, onward)      Start Ordered     Status Ordering Provider    10/28/23 2128 10/28/23 2130  Inpatient Wound Care MD Consult  Once        Specialty:  Wound Care  Provider:  Zamzam Sousa APRN Acknowledged JESSEE, ALI JANELL           Attending Provider: Brittnee Walter  Length of Stay: 2    SUBJECTIVE   Chief Complaint: LLE wound    HPI: Desi Hatfield, a 47 y.o.female, presents with a past medical history of lupus, antiphospholipid syndrome, DVT, pulmonary emboli.  A full past medical history as listed below.  Patient presented to the hospital on 10/20 due to lower extremity redness, pain, and swelling.  Patient has an open wound of the left lower extremity that she reports has been open for 8 to 10 years.  She has been receiving care at [facility] where they treat with PolyMem and Unna boot.  She is reportedly been on multiple antibiotics including Cipro, Levaquin, doxycycline with return of infection.  Patient reports Unna boot remains in place for a week but has strikethrough after 2 to 3 days.  She has scarring present on left anterior lower leg from wound that has been healed for 5 to 6 years.      On presentation to the hospital patient was diagnosed with cellulitis and started on antibiotics.  Wound culture was collected on 10/29 with preliminary results showing no growth on culture and no white blood cells or organisms seen on Gram stain.  Blood cultures collected on 10/28 have preliminary results of no growth as well.  A1c is 5.3.  C-reactive protein was 5.5 on admission down to 3.89 yesterday.  CBC with differential daily with no significant results.        Visit Dx:    ICD-10-CM ICD-9-CM   1. Cellulitis, unspecified cellulitis site  L03.90 682.9  Addended by: CHANEL BUSTOS on: 9/24/2023 07:46 PM     Modules accepted: Orders           Hospital Problem List:     Cellulitis    Lower extremity pain    Open wound of left lower extremity    History of DVT (deep vein thrombosis)    Antiphospholipid syndrome    Chronic anticoagulation      History:   Past Medical History:   Diagnosis Date    Antiphospholipid syndrome     DVT (deep venous thrombosis)     Lupus     Pulmonary emboli      Past Surgical History:   Procedure Laterality Date    ABDOMINAL SURGERY      BACK SURGERY      CHOLECYSTECTOMY      ENDOSCOPY      HYSTERECTOMY      VASCULAR SURGERY       Social History     Socioeconomic History    Marital status: Single   Tobacco Use    Smoking status: Every Day     Packs/day: 1.00     Years: 30.00     Additional pack years: 0.00     Total pack years: 30.00     Types: Cigarettes    Smokeless tobacco: Never   Vaping Use    Vaping Use: Never used   Substance and Sexual Activity    Alcohol use: Not Currently    Drug use: Never    Sexual activity: Yes     Partners: Male     History reviewed. No pertinent family history.    Allergies:  Allergies   Allergen Reactions    Butorphanol Other (See Comments)    Ondansetron Hcl Headache    Latex Hives and Rash    Sulfamethoxazole-Trimethoprim Nausea And Vomiting    Morphine Other (See Comments) and Rash     Lowers BP       Medications:    Current Facility-Administered Medications:     acetaminophen (TYLENOL) tablet 650 mg, 650 mg, Oral, Q4H PRN, Amador Burciaga DO    ALPRAZolam (XANAX) tablet 1 mg, 1 mg, Oral, TID PRN, Amador Burciaga DO, 1 mg at 10/29/23 2139    sennosides-docusate (PERICOLACE) 8.6-50 MG per tablet 2 tablet, 2 tablet, Oral, BID, 2 tablet at 10/29/23 0842 **AND** polyethylene glycol (MIRALAX) packet 17 g, 17 g, Oral, Daily PRN **AND** bisacodyl (DULCOLAX) EC tablet 5 mg, 5 mg, Oral, Daily PRN **AND** bisacodyl (DULCOLAX) suppository 10 mg, 10 mg, Rectal, Daily PRN, Amador Burciaga DO    famotidine (PEPCID) tablet 40 mg, 40 mg, Oral, Daily, Foster Son APRN    [START ON  10/31/2023] furosemide (LASIX) tablet 40 mg, 40 mg, Oral, Daily, Foster Son, APRN    gabapentin (NEURONTIN) capsule 600 mg, 600 mg, Oral, Q6H, Foster Son, APRN    HYDROmorphone (DILAUDID) injection 1 mg, 1 mg, Intravenous, Q6H PRN, Foster Son APRN, 1 mg at 10/30/23 0808    miconazole (MICOTIN) 2 % cream 1 application , 1 application , Topical, Q12H, Fsoter Son APRN, 1 application  at 10/30/23 0804    ondansetron (ZOFRAN) injection 4 mg, 4 mg, Intravenous, Q6H PRN, Amador Burciaga DO    oxyCODONE-acetaminophen (PERCOCET)  MG per tablet 1 tablet, 1 tablet, Oral, Q6H PRN, Amador Burciaga DO, 1 tablet at 10/30/23 0641    pantoprazole (PROTONIX) EC tablet 40 mg, 40 mg, Oral, BID, Foster Son APRN, 40 mg at 10/30/23 0804    piperacillin-tazobactam (ZOSYN) 3.375 g in iso-osmotic dextrose 50 ml (premix), 3.375 g, Intravenous, Q8H, Amador Burciaga DO, 3.375 g at 10/30/23 0610    potassium chloride (MICRO-K/KLOR-CON) CR capsule, 20 mEq, Oral, Daily, Amador Burciaga DO, 20 mEq at 10/30/23 0804    promethazine (PHENERGAN) tablet 25 mg, 25 mg, Oral, Q6H PRN, Amador Burciaga DO    rivaroxaban (XARELTO) tablet 20 mg, 20 mg, Oral, Daily With Dinner, Amador Burciaga DO, 20 mg at 10/29/23 1724    sodium chloride 0.9 % flush 10 mL, 10 mL, Intravenous, Q12H, Amador Burciaga DO, 10 mL at 10/30/23 0803    sodium chloride 0.9 % flush 10 mL, 10 mL, Intravenous, PRN, Amador Burciaga DO    sodium chloride 0.9 % infusion 40 mL, 40 mL, Intravenous, PRN, Amador Burciaga DO    sucralfate (CARAFATE) tablet 1 g, 1 g, Oral, 4x Daily AC & at Bedtime, Amador Burciaga DO, 1 g at 10/30/23 0805    vancomycin (VANCOCIN) 1,000 mg in sodium chloride 0.9 % 250 mL IVPB-VTB, 1,000 mg, Intravenous, Q12H, Jax Gordillo DO, Last Rate: 250 mL/hr at 10/30/23 0802, 1,000 mg at 10/30/23 0802    zolpidem (AMBIEN) tablet 5 mg, 5 mg, Oral, Nightly PRN, Jax Gordillo DO    Review of  Systems:   Review of Systems   Constitutional:  Positive for activity change and fatigue. Negative for chills and fever.   Respiratory:  Negative for cough and shortness of breath.    Cardiovascular:  Positive for leg swelling.   Gastrointestinal:  Negative for diarrhea, nausea and vomiting.   Musculoskeletal:  Positive for gait problem and myalgias.   Skin:  Positive for color change and wound.   Neurological:  Positive for weakness. Negative for dizziness and headaches.   Psychiatric/Behavioral:  Negative for agitation and behavioral problems.          OBJECTIVE     Vitals:    10/30/23 0719   BP: 128/75   Pulse: 86   Resp: 16   Temp: 97.6 °F (36.4 °C)   SpO2: 94%       PHYSICAL EXAM:   Physical Exam  Vitals and nursing note reviewed.   Constitutional:       General: She is sleeping.      Appearance: She is obese.      Comments: Body mass index is 36.25 kg/m².    HENT:      Head: Normocephalic and atraumatic.   Eyes:      General: Lids are normal. Gaze aligned appropriately.   Cardiovascular:      Rate and Rhythm: Normal rate and regular rhythm.      Pulses:           Dorsalis pedis pulses are 2+ on the right side and 2+ on the left side.   Pulmonary:      Effort: Pulmonary effort is normal. No respiratory distress.   Musculoskeletal:      Cervical back: Normal range of motion and neck supple.      Right lower le+ Pitting Edema present.      Left lower le+ Pitting Edema present.   Feet:      Right foot:      Skin integrity: No ulcer or erythema.      Left foot:      Skin integrity: No ulcer or erythema.   Skin:     General: Skin is warm and dry.      Findings: Wound present.      Comments: Open wound of left lower extremity measuring 6.7 cm x 4.  2 cm x 0.2 cm.  Wound bed covered with 100% slough.  Irregular edges attached to wound bed.  No undermining or tunneling noted.  Periwound area is pink with scarring present.  Edema present of lower extremity   Neurological:      Mental Status: She is alert,  oriented to person, place, and time and easily aroused.      Motor: Weakness present.      Gait: Gait abnormal.   Psychiatric:         Attention and Perception: Attention normal.         Mood and Affect: Mood normal.         Speech: Speech normal.         Behavior: Behavior is cooperative.       Picture taken by nursing staff on admission         Results Review:  Lab Results (last 48 hours)       Procedure Component Value Units Date/Time    Wound Culture - Wound, Leg, Left [[id number]] Collected: 10/29/23 0013    Specimen: Wound from Leg, Left Updated: 10/30/23 0907     Wound Culture No growth     Gram Stain No WBCs or organisms seen    Basic Metabolic Panel [[id number]]  (Abnormal) Collected: 10/30/23 0434    Specimen: Blood Updated: 10/30/23 0518     Glucose 96 mg/dL      BUN 12 mg/dL      Creatinine 0.85 mg/dL      Sodium 144 mmol/L      Potassium 4.2 mmol/L      Chloride 105 mmol/L      CO2 31.0 mmol/L      Calcium 8.7 mg/dL      BUN/Creatinine Ratio 14.1     Anion Gap 8.0 mmol/L      eGFR 85.2 mL/min/1.73     Narrative:      GFR Normal >60  Chronic Kidney Disease <60  Kidney Failure <15      CBC (No Diff) [[id number]]  (Normal) Collected: 10/30/23 0434    Specimen: Blood Updated: 10/30/23 0503     WBC 6.39 10*3/mm3      RBC 3.92 10*6/mm3      Hemoglobin 12.3 g/dL      Hematocrit 36.7 %      MCV 93.6 fL      MCH 31.4 pg      MCHC 33.5 g/dL      RDW 12.7 %      RDW-SD 43.2 fl      MPV 9.3 fL      Platelets 244 10*3/mm3     Blood Culture - Blood, Arm, Right [[id number]]  (Normal) Collected: 10/28/23 1907    Specimen: Blood from Arm, Right Updated: 10/29/23 1930     Blood Culture No growth at 24 hours    Blood Culture - Blood, Arm, Left [[id number]]  (Normal) Collected: 10/28/23 1854    Specimen: Blood from Arm, Left Updated: 10/29/23 1916     Blood Culture No growth at 24 hours    MRSA Screen, PCR (Inpatient) - Swab, Nares [[id number]]  (Normal) Collected: 10/29/23 1543    Specimen: Swab from Nares Updated: 10/29/23  1708     MRSA PCR No MRSA Detected    Narrative:      The negative predictive value of this diagnostic test is high and should only be used to consider de-escalating anti-MRSA therapy. A positive result may indicate colonization with MRSA and must be correlated clinically.    Hemoglobin A1c [[id number]]  (Normal) Collected: 10/29/23 0443    Specimen: Blood Updated: 10/29/23 1314     Hemoglobin A1C 5.30 %     Narrative:      Hemoglobin A1C Ranges:    Increased Risk for Diabetes  5.7% to 6.4%  Diabetes                     >= 6.5%  Diabetic Goal                < 7.0%    CBC Auto Differential [[id number]]  (Abnormal) Collected: 10/29/23 0443    Specimen: Blood Updated: 10/29/23 0541     WBC 6.06 10*3/mm3      RBC 3.81 10*6/mm3      Hemoglobin 11.7 g/dL      Hematocrit 35.7 %      MCV 93.7 fL      MCH 30.7 pg      MCHC 32.8 g/dL      RDW 12.8 %      RDW-SD 43.8 fl      MPV 9.4 fL      Platelets 261 10*3/mm3     Narrative:      The previously reported component NRBC is no longer being reported. Previous result was 0.0 /100 WBC (Reference Range: 0.0-0.2 /100 WBC) on 10/29/2023 at 0523 CDT.    Manual Differential [[id number]] Collected: 10/29/23 0443    Specimen: Blood Updated: 10/29/23 0541     Neutrophil % 57.0 %      Lymphocyte % 27.0 %      Monocyte % 11.0 %      Eosinophil % 3.0 %      Atypical Lymphocyte % 2.0 %      Neutrophils Absolute 3.45 10*3/mm3      Lymphocytes Absolute 1.76 10*3/mm3      Monocytes Absolute 0.67 10*3/mm3      Eosinophils Absolute 0.18 10*3/mm3      RBC Morphology Normal     WBC Morphology Normal     Giant Platelets Slight/1+    Sedimentation Rate [[id number]]  (Abnormal) Collected: 10/29/23 0443    Specimen: Blood Updated: 10/29/23 0532     Sed Rate 39 mm/hr     Comprehensive Metabolic Panel [[id number]]  (Abnormal) Collected: 10/29/23 0443    Specimen: Blood Updated: 10/29/23 0531     Glucose 144 mg/dL      BUN 15 mg/dL      Creatinine 0.95 mg/dL      Sodium 139 mmol/L      Potassium 3.2 mmol/L       Chloride 97 mmol/L      CO2 33.0 mmol/L      Calcium 8.6 mg/dL      Total Protein 6.3 g/dL      Albumin 3.4 g/dL      ALT (SGPT) 15 U/L      AST (SGOT) 20 U/L      Alkaline Phosphatase 89 U/L      Total Bilirubin <0.2 mg/dL      Globulin 2.9 gm/dL      A/G Ratio 1.2 g/dL      BUN/Creatinine Ratio 15.8     Anion Gap 9.0 mmol/L      eGFR 74.5 mL/min/1.73     Narrative:      GFR Normal >60  Chronic Kidney Disease <60  Kidney Failure <15      C-reactive Protein [[id number]]  (Abnormal) Collected: 10/29/23 0443    Specimen: Blood Updated: 10/29/23 0531     C-Reactive Protein 3.89 mg/dL     Urinalysis With Microscopic If Indicated (No Culture) - Urine, Clean Catch [[id number]]  (Normal) Collected: 10/28/23 2003    Specimen: Urine, Clean Catch Updated: 10/28/23 2037     Color, UA Yellow     Appearance, UA Clear     pH, UA 5.5     Specific Gravity, UA 1.008     Glucose, UA Negative     Ketones, UA Negative     Bilirubin, UA Negative     Blood, UA Negative     Protein, UA Negative     Leuk Esterase, UA Negative     Nitrite, UA Negative     Urobilinogen, UA 0.2 E.U./dL    Narrative:      Urine microscopic not indicated.    COVID PRE-OP / PRE-PROCEDURE SCREENING ORDER (NO ISOLATION) - Swab, Nasopharynx [[id number]]  (Normal) Collected: 10/28/23 1859    Specimen: Swab from Nasopharynx Updated: 10/28/23 1944    Narrative:      The following orders were created for panel order COVID PRE-OP / PRE-PROCEDURE SCREENING ORDER (NO ISOLATION) - Swab, Nasopharynx.  Procedure                               Abnormality         Status                     ---------                               -----------         ------                     COVID-19, FLU A/B, RSV P...[[id number]]  Normal              Final result                 Please view results for these tests on the individual orders.    COVID-19, FLU A/B, RSV PCR - Swab, Nasopharynx [[id number]]  (Normal) Collected: 10/28/23 1859    Specimen: Swab from Nasopharynx Updated: 10/28/23 1944      "COVID19 Not Detected     Influenza A PCR Not Detected     Influenza B PCR Not Detected     RSV, PCR Not Detected    Narrative:      Fact sheet for providers: https://www.fda.gov/media/381951/download    Fact sheet for patients: https://www.fda.gov/media/452985/download    Test performed by PCR.    Procalcitonin [[id number]]  (Normal) Collected: 10/28/23 1854    Specimen: Blood Updated: 10/28/23 1932     Procalcitonin 0.09 ng/mL     Narrative:      As a Marker for Sepsis (Non-Neonates):    1. <0.5 ng/mL represents a low risk of severe sepsis and/or septic shock.  2. >2 ng/mL represents a high risk of severe sepsis and/or septic shock.    As a Marker for Lower Respiratory Tract Infections that require antibiotic therapy:    PCT on Admission    Antibiotic Therapy       6-12 Hrs later    >0.5                Strongly Recommended  >0.25 - <0.5        Recommended   0.1 - 0.25          Discouraged              Remeasure/reassess PCT  <0.1                Strongly Discouraged     Remeasure/reassess PCT    As 28 day mortality risk marker: \"Change in Procalcitonin Result\" (>80% or <=80%) if Day 0 (or Day 1) and Day 4 values are available. Refer to http://www[facility] [place]    Change in PCT <=80%  A decrease of PCT levels below or equal to 80% defines a positive change in PCT test result representing a higher risk for 28-day all-cause mortality of patients diagnosed with severe sepsis for septic shock.    Change in PCT >80%  A decrease of PCT levels of more than 80% defines a negative change in PCT result representing a lower risk for 28-day all-cause mortality of patients diagnosed with severe sepsis or septic shock.       C-reactive Protein [[id number]]  (Abnormal) Collected: 10/28/23 1854    Specimen: Blood Updated: 10/28/23 1931     C-Reactive Protein 5.50 mg/dL     Comprehensive Metabolic Panel [[id number]]  (Abnormal) Collected: 10/28/23 1854    Specimen: Blood Updated: 10/28/23 1929     Glucose 149 mg/dL      BUN " 20 mg/dL      Creatinine 1.30 mg/dL      Sodium 138 mmol/L      Potassium 3.3 mmol/L      Chloride 95 mmol/L      CO2 35.0 mmol/L      Calcium 9.2 mg/dL      Total Protein 6.7 g/dL      Albumin 3.7 g/dL      ALT (SGPT) 17 U/L      AST (SGOT) 25 U/L      Alkaline Phosphatase 99 U/L      Total Bilirubin 0.2 mg/dL      Globulin 3.0 gm/dL      A/G Ratio 1.2 g/dL      BUN/Creatinine Ratio 15.4     Anion Gap 8.0 mmol/L      eGFR 51.1 mL/min/1.73     Narrative:      GFR Normal >60  Chronic Kidney Disease <60  Kidney Failure <15      Lactic Acid, Plasma [[id number]]  (Normal) Collected: 10/28/23 1854    Specimen: Blood Updated: 10/28/23 1926     Lactate 1.6 mmol/L     BNP [[id number]]  (Normal) Collected: 10/28/23 1854    Specimen: Blood Updated: 10/28/23 1925     proBNP 166.3 pg/mL     Narrative:      This assay is used as an aid in the diagnosis of individuals suspected of having heart failure. It can be used as an aid in the diagnosis of acute decompensated heart failure (ADHF) in patients presenting with signs and symptoms of ADHF to the emergency department (ED). In addition, NT-proBNP of <300 pg/mL indicates ADHF is not likely.    Age Range Result Interpretation  NT-proBNP Concentration (pg/mL:      <50             Positive            >450                   Gray                 300-450                    Negative             <300    50-75           Positive            >900                  Gray                300-900                  Negative            <300      >75             Positive            >1800                  Gray                300-1800                  Negative            <300    Sedimentation Rate [[id number]]  (Abnormal) Collected: 10/28/23 1854    Specimen: Blood Updated: 10/28/23 1917     Sed Rate 38 mm/hr     CBC & Differential [[id number]]  (Normal) Collected: 10/28/23 1854    Specimen: Blood Updated: 10/28/23 1906    Narrative:      The following orders were created for panel order CBC &  Differential.  Procedure                               Abnormality         Status                     ---------                               -----------         ------                     CBC Auto Differential[[id number]]        Normal              Final result                 Please view results for these tests on the individual orders.    CBC Auto Differential [[id number]]  (Normal) Collected: 10/28/23 1854    Specimen: Blood Updated: 10/28/23 1906     WBC 8.07 10*3/mm3      RBC 4.09 10*6/mm3      Hemoglobin 12.7 g/dL      Hematocrit 37.8 %      MCV 92.4 fL      MCH 31.1 pg      MCHC 33.6 g/dL      RDW 12.6 %      RDW-SD 42.7 fl      MPV 9.3 fL      Platelets 279 10*3/mm3      Neutrophil % 64.7 %      Lymphocyte % 20.7 %      Monocyte % 9.8 %      Eosinophil % 4.0 %      Basophil % 0.6 %      Immature Grans % 0.2 %      Neutrophils, Absolute 5.22 10*3/mm3      Lymphocytes, Absolute 1.67 10*3/mm3      Monocytes, Absolute 0.79 10*3/mm3      Eosinophils, Absolute 0.32 10*3/mm3      Basophils, Absolute 0.05 10*3/mm3      Immature Grans, Absolute 0.02 10*3/mm3      nRBC 0.0 /100 WBC           Imaging Results (Last 72 Hours)       ** No results found for the last 72 hours. **               ASSESSMENT/PLAN       Examination and evaluation of wound(s) was performed.    DIAGNOSIS:     Cellulitis    Lower extremity pain    Open wound of left lower extremity    History of DVT (deep vein thrombosis)    Antiphospholipid syndrome    Chronic anticoagulation       PLAN:   Orders placed for treatment of ulceration with use of Opticell Ag and Unna boot application.   Patient would like to transfer care to [facility] wound care center. Order placed for referral.         Start     Ordered    10/30/23 1042  Application Unnaboot  Until Discontinued        Comments: Opticell Ag and unna to LLE    10/30/23 1041    10/30/23 0000  Ambulatory Referral to Wound Clinic         10/30/23 1043               Discussed findings and treatment plan  including risks, benefits, and treatment options with patient in detail. Patient agreed with treatment plan.      This document has been electronically signed by EVERARDO العلي on 10/30/2023 10:41 CDT

## 2024-01-02 ENCOUNTER — OFFICE VISIT (OUTPATIENT)
Dept: URGENT CARE | Facility: PHYSICIAN GROUP | Age: 29
End: 2024-01-02
Payer: COMMERCIAL

## 2024-01-02 VITALS
WEIGHT: 191.4 LBS | OXYGEN SATURATION: 97 % | HEART RATE: 86 BPM | HEIGHT: 72 IN | BODY MASS INDEX: 25.92 KG/M2 | RESPIRATION RATE: 16 BRPM | TEMPERATURE: 98.4 F | SYSTOLIC BLOOD PRESSURE: 100 MMHG | DIASTOLIC BLOOD PRESSURE: 66 MMHG

## 2024-01-02 DIAGNOSIS — J06.9 UPPER RESPIRATORY TRACT INFECTION, UNSPECIFIED TYPE: ICD-10-CM

## 2024-01-02 DIAGNOSIS — J10.1 INFLUENZA A: ICD-10-CM

## 2024-01-02 LAB
FLUAV RNA SPEC QL NAA+PROBE: POSITIVE
FLUBV RNA SPEC QL NAA+PROBE: NEGATIVE
RSV RNA SPEC QL NAA+PROBE: NEGATIVE
SARS-COV-2 RNA RESP QL NAA+PROBE: NEGATIVE

## 2024-01-02 PROCEDURE — 99213 OFFICE O/P EST LOW 20 MIN: CPT

## 2024-01-02 PROCEDURE — 3074F SYST BP LT 130 MM HG: CPT

## 2024-01-02 PROCEDURE — 3078F DIAST BP <80 MM HG: CPT

## 2024-01-02 PROCEDURE — 0241U POCT CEPHEID COV-2, FLU A/B, RSV - PCR: CPT

## 2024-01-02 RX ORDER — BENZONATATE 100 MG/1
100 CAPSULE ORAL 3 TIMES DAILY PRN
Qty: 30 CAPSULE | Refills: 0 | Status: SHIPPED | OUTPATIENT
Start: 2024-01-02 | End: 2024-01-12

## 2024-01-02 RX ORDER — DEXTROMETHORPHAN HYDROBROMIDE AND PROMETHAZINE HYDROCHLORIDE 15; 6.25 MG/5ML; MG/5ML
5 SYRUP ORAL EVERY 6 HOURS PRN
Qty: 100 ML | Refills: 0 | Status: SHIPPED | OUTPATIENT
Start: 2024-01-02

## 2024-01-03 NOTE — PROGRESS NOTES
Chief Complaint   Patient presents with    Cough     Muscle aches, headache, fever, L ear pain, rib pain, x3 days          Subjective:   HISTORY OF PRESENT ILLNESS: Shruthi Alvarado is a 28 y.o. female who presents for uri symptoms with left ear pain x 3 days.    Patient denies SOB or chest pain.  Subjective fevers    Medications, Allergies, current problem list, Social and Family history reviewed today in Epic.     Objective:     /66 (BP Location: Right arm, Patient Position: Sitting, BP Cuff Size: Adult)   Pulse 86   Temp 36.9 °C (98.4 °F) (Temporal)   Resp 16   Ht 1.829 m (6')   Wt 86.8 kg (191 lb 6.4 oz)   SpO2 97%     Physical Exam  Vitals reviewed.   Constitutional:       Appearance: Normal appearance.   HENT:      Head: Normocephalic.      Right Ear: Tympanic membrane normal.      Left Ear: Tympanic membrane normal.      Nose: Congestion present.      Mouth/Throat:      Mouth: Mucous membranes are moist.      Pharynx: Posterior oropharyngeal erythema present.   Eyes:      Pupils: Pupils are equal, round, and reactive to light.   Cardiovascular:      Rate and Rhythm: Normal rate.      Pulses: Normal pulses.   Pulmonary:      Effort: Pulmonary effort is normal. No respiratory distress.      Breath sounds: Normal breath sounds. No wheezing.   Musculoskeletal:         General: Normal range of motion.   Skin:     General: Skin is warm and dry.   Neurological:      Mental Status: She is alert and oriented to person, place, and time.   Psychiatric:         Mood and Affect: Mood normal.          Assessment/Plan:     Diagnosis and associated orders    I personally reviewed prior external notes and test results pertinent to today's visit.     1. Upper respiratory tract infection, unspecified type  POCT CEPHEID COV-2, FLU A/B, RSV - PCR    promethazine-dextromethorphan (PROMETHAZINE-DM) 6.25-15 MG/5ML syrup    benzonatate (TESSALON) 100 MG Cap      2. Influenza A          Results for orders placed or  performed in visit on 01/02/24   POCT CEPHEID COV-2, FLU A/B, RSV - PCR   Result Value Ref Range    SARS-CoV-2 by PCR Negative Negative, Invalid    Influenza virus A RNA Positive (A) Negative, Invalid    Influenza virus B, PCR Negative Negative, Invalid    RSV, PCR Negative Negative, Invalid         IMPRESSION:  Exam findings reassuring with stable vital signs, No red flag symptoms or exam findings. Explained that S/S are consistent with a viral illness and are self limiting.  No antibiotics are indicated at this time. Pending viral testing will be available in [facility] symptomatic relief measures were recommended.  Supportive care also discussed to include the use of warm salt water gargles, saline nasal rinses, steam inhalation, and the use of a cool-mist humidifier in the bedroom at night.          Differential diagnosis discussed. Pt was Educated on red flag symptoms. Pt has been Instructed to return to Urgent Care or nearest Emergency Department if symptoms fail to improve, for any change in condition, further concerns, or new concerning symptoms. Patient states understanding of the plan of care and discharge instructions.  They are discharged in stable condition.         Please note that this dictation was created using voice recognition software. I have made a reasonable attempt to correct obvious errors, but I expect that there are errors of grammar and possibly content that I did not discover before finalizing the note.    This note was electronically signed by MICHELLE Galindo